# Patient Record
Sex: FEMALE | Race: WHITE | URBAN - METROPOLITAN AREA
[De-identification: names, ages, dates, MRNs, and addresses within clinical notes are randomized per-mention and may not be internally consistent; named-entity substitution may affect disease eponyms.]

---

## 2022-06-24 ENCOUNTER — OFFICE VISIT (OUTPATIENT)
Dept: URBAN - METROPOLITAN AREA CLINIC 87 | Facility: CLINIC | Age: 62
End: 2022-06-24
Payer: OTHER GOVERNMENT

## 2022-06-24 DIAGNOSIS — H35.342 MACULAR CYST, HOLE, OR PSEUDOHOLE, LEFT EYE: Primary | ICD-10-CM

## 2022-06-24 DIAGNOSIS — H25.13 AGE-RELATED NUCLEAR CATARACT, BILATERAL: ICD-10-CM

## 2022-06-24 DIAGNOSIS — H43.822 VITREOMACULAR ADHESION, LEFT EYE: ICD-10-CM

## 2022-06-24 PROCEDURE — 99204 OFFICE O/P NEW MOD 45 MIN: CPT | Performed by: OPHTHALMOLOGY

## 2022-06-24 PROCEDURE — 92134 CPTRZ OPH DX IMG PST SGM RTA: CPT | Performed by: OPHTHALMOLOGY

## 2022-06-24 ASSESSMENT — INTRAOCULAR PRESSURE
OS: 17
OD: 20

## 2022-06-24 NOTE — IMPRESSION/PLAN
Impression: Macular cyst, hole, or pseudohole, left eye: H35.342. Left.
-symptoms x 1 week OCT: 
OD: wnl OS; VMT, small MH Plan: Examination and OCT reveal a full-thickness macular hole. The diagnosis, natural history, and prognosis of full-thickness macular hole, as well as the risks and benefits of vitrectomy/membrane peeling/gas with positioning versus observation were discussed at length. Given the patient's current visual acuity and hindrance on activities of daily living, surgical correction was recommended. The patient was informed of altitude precautions and the danger of blindness or loss of the eye with travel to higher altitude or with flying. The patient understands that if the hole is successfully closed, the vision usually improves; however the vision does not typically return to normal, and improvement usually takes place gradually over a period of several months to one year. Rec 25g PPV, ILM Peel, SF6 (5 days face down) -schedule in August
-pt will call us if any change in vision to see if there is spontaneous improvement

## 2022-06-24 NOTE — IMPRESSION/PLAN
Impression: Age-related nuclear cataract, bilateral: H25.13. Bilateral. Plan: Discussed progression after PPV.

## 2022-08-16 ENCOUNTER — POST-OPERATIVE VISIT (OUTPATIENT)
Dept: URBAN - METROPOLITAN AREA CLINIC 13 | Facility: CLINIC | Age: 62
End: 2022-08-16

## 2022-08-16 DIAGNOSIS — H35.342 MACULAR CYST, HOLE, OR PSEUDOHOLE, LEFT EYE: Primary | ICD-10-CM

## 2022-08-16 PROCEDURE — 99024 POSTOP FOLLOW-UP VISIT: CPT | Performed by: OPHTHALMOLOGY

## 2022-08-16 RX ORDER — BRIMONIDINE TARTRATE 2 MG/ML
0.2 % SOLUTION/ DROPS OPHTHALMIC
Qty: 5 | Refills: 2 | Status: ACTIVE
Start: 2022-08-16

## 2022-08-16 ASSESSMENT — INTRAOCULAR PRESSURE
OS: 34
OD: 21

## 2022-08-16 NOTE — IMPRESSION/PLAN
Impression: S/P 25g PPV, ILM Peel, SF6 gas OS OS - 1 Day. Macular cyst, hole, or pseudohole, left eye  H35.342. Plan: PF/Oflox QID. Gas precautions. FDP x 5 days. Alt route to [facility] given.  

RTC 1 week DFE OS

## 2022-08-19 ENCOUNTER — POST-OPERATIVE VISIT (OUTPATIENT)
Dept: URBAN - METROPOLITAN AREA CLINIC 87 | Facility: CLINIC | Age: 62
End: 2022-08-19
Payer: OTHER GOVERNMENT

## 2022-08-19 PROCEDURE — 99024 POSTOP FOLLOW-UP VISIT: CPT | Performed by: OPHTHALMOLOGY

## 2022-08-19 ASSESSMENT — INTRAOCULAR PRESSURE
OD: 21
OS: 16

## 2022-08-19 NOTE — IMPRESSION/PLAN
Impression: S/P 25g PPV, ILM Peel, SF6 gas OS OS - 4 Days. Macular cyst, hole, or pseudohole, left eye  H35.342. Plan: Continue PF/Oflox QID x 3 days then Stop Oflox and Taper PF 3-2-1. Gas precautions. Sleep on either side.  

RTC 1 month POS DFE/OCT OS

## 2022-09-16 ENCOUNTER — POST-OPERATIVE VISIT (OUTPATIENT)
Dept: URBAN - METROPOLITAN AREA CLINIC 87 | Facility: CLINIC | Age: 62
End: 2022-09-16
Payer: OTHER GOVERNMENT

## 2022-09-16 DIAGNOSIS — H35.342 MACULAR CYST, HOLE, OR PSEUDOHOLE, LEFT EYE: Primary | ICD-10-CM

## 2022-09-16 PROCEDURE — 99024 POSTOP FOLLOW-UP VISIT: CPT | Performed by: OPHTHALMOLOGY

## 2022-09-16 ASSESSMENT — INTRAOCULAR PRESSURE
OD: 26
OS: 22

## 2022-12-23 ENCOUNTER — OFFICE VISIT (OUTPATIENT)
Dept: URBAN - METROPOLITAN AREA CLINIC 87 | Facility: CLINIC | Age: 62
End: 2022-12-23
Payer: OTHER GOVERNMENT

## 2022-12-23 DIAGNOSIS — H40.9 GLAUCOMA: ICD-10-CM

## 2022-12-23 DIAGNOSIS — H25.13 AGE-RELATED NUCLEAR CATARACT, BILATERAL: ICD-10-CM

## 2022-12-23 DIAGNOSIS — H43.822 VITREOMACULAR ADHESION, LEFT EYE: ICD-10-CM

## 2022-12-23 DIAGNOSIS — H35.342 MACULAR CYST, HOLE, OR PSEUDOHOLE, LEFT EYE: Primary | ICD-10-CM

## 2022-12-23 PROCEDURE — 92134 CPTRZ OPH DX IMG PST SGM RTA: CPT | Performed by: OPHTHALMOLOGY

## 2022-12-23 PROCEDURE — 99213 OFFICE O/P EST LOW 20 MIN: CPT | Performed by: OPHTHALMOLOGY

## 2022-12-23 ASSESSMENT — INTRAOCULAR PRESSURE
OS: 24
OD: 26

## 2022-12-23 NOTE — IMPRESSION/PLAN
Impression: Macular cyst, hole, or pseudohole, left eye  H35.342. S/P 25g PPV, ILM Peel, SF6 gas OS

OCT:
OD: Tr cystic change OS: focal outer retinal atrophy, hole closed Plan: Hole closed. Doing well. Cleared for cat sx OS.  

RTC 12 months DFE OU OCT OU

## 2024-04-02 NOTE — IMPRESSION/PLAN
Impression: S/P 25g PPV, ILM Peel, SF6 gas OS OS - 32 Days. Macular cyst, hole, or pseudohole, left eye  H35.342. OCT:
OD: Tr cystic change OS: focal outer retinal atrophy, hole closed Plan: Off drops. Gas gone. 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    POSTOPERATIVE DIAGNOSIS: failed right total hip arthroplastye due to femoral loosening, and poly wear.    OPERATION: revision right TOTAL HIP ARTHROPLASTY both components    Operative specifics:A Depuy reclaim revision hip stem was implanted.   Size 16 standard offset short stem, size 36 +5mm ceramic head.   Size 52 toby trilogy cup was retained.  Size 36 by 52 lateralized liner was implanted      Kanu Lira, PAC was utilized to help position the patient prior to surgery, position the limb and retract vital structures during the surgery and close wound after surgery.            Diamond is a pleasant 75-year-old female patient who underwent right total hip arthroplasty in the remote past.  She had a cemented implant, and unfortunately the femoral component has come loose.  The acetabular component appears to be well fixed, and our plan is to revise the femoral component with a non cemented device, and retain the acetabular component.  The patient is aware that we could end up having to revise the entire device, if the hip is unstable, or the acetabulum is not in the correct position.. I did discuss all the risks, benefits, possible complications and alternatives to surgical treatment of hip replacement. We talked about infection, DVT, nerve damage, blood loss, loss of fixation, fracture, postop stiffness, anesthetic and medical complications in and around the time of surgery including death, dislocation, leg length discrepancy and the fact that all of the pain may not be completely controlled with the surgery. The patient and/or the patient's legal representative were aware of all these risks, signed the consent and wished to proceed with surgery.    OPERATIVE PROCEDURE  The patient was  taken to the OR and placed supine on a standard operative table. A general anesthetic was administered. The patient was then placed into the left lateral decubitus position with the operative right hip exposed. It was over a well-padded peg board. Pegs were placed between the xiphoid, pubis, mid back and sacral area. There was an axillary roll placed, the down leg was padded throughout and all bony prominences were padded throughout.  The correct limb was identified, and a time-out was performed.  We prepped and draped the operative hip as is customary for total hip arthroplasty. We marked a 16 cm curvilinear incision centered on the greater trochanter, utilizing part of the previous incision. We incised through the skin and subcutaneous tissue down to the IT band, developed flaps anterior and posterior to the IT band and then incised through the IT band in line with the incision. We placed a Charnley retractor deep to the IT band, a Cobra retractor around the medius and minimus to protect them. We took down the piriformis and tagged it, took down the posterior capsule and tagged it, retracted the 2 pound weight and mobilized it. We then dislocated the femoral head, and removed the head with a tamp.  We then removed tissue around the femoral component, and took 2 specimens for culture.  The femoral stem was removed, and was loose from the cement mantle.  We then removed all the cement using osteotomes, and then our cement extraction device.  Following this we placed retractors around the acetabulum, and removed our acetabular liner.  We cleared off bone around the acetabular component, and confirmed that it was in adequate position.  We then placed a trial lateralized liner for a size 52 cup in position, and turned our attention to the femur.  We reamed for a standard length reclaim stem, and reamed up in stepwise fashion to a size 16 Reamer.  This was the correct Reamer, so then we reamed our proximal body, and  placed our trial into position, with roughly 25° of anteversion on the femur.  This gave us good fit and fill with a + 5 head, gave us equal leg lengths, and full stability throughout range of motion.  We were happy with this we removed the trial component, placed retractors around the acetabulum, and removed our trial acetabular liner, and implanted a 52 x 36 neutral lateralized polyethylene liner.  We impacted it into position, and confirmed it was seated.  We then placed retractors around the femoral stem, and impacted our standard offset size 16 standard length reclaim stem into position.  We placed a 36+ 5 ceramic head onto the construct.   We irrigated the hip, reduced it, repaired our posterior capsule and the piriformis to the abductors with interrupted Ethibond sutures. We repaired the IT band with interrupted Ethibond sutures and a running stitch, closed the skin in a layered fashion. A sterile dressing was placed on the wound. The patient had an abduction pillow placed.  The patient was extubated.  The patient was taken to the PACU in stable condition.    SURGEON(S): Rajan Arguello M.D.  ASSISTANT: Kanu Lira PA-C  ANESTHESIA: general  ESTIMATE BLOOD LOSS: 250cc   DRAINS: None  FINDINGS: failed right total hip arthroplasty--loose femoral stem, poly wear.  COMPLICATIONS: None  SPECIMENS:  2 specimens sent for culture